# Patient Record
Sex: MALE | Race: BLACK OR AFRICAN AMERICAN | NOT HISPANIC OR LATINO
[De-identification: names, ages, dates, MRNs, and addresses within clinical notes are randomized per-mention and may not be internally consistent; named-entity substitution may affect disease eponyms.]

---

## 2021-10-13 ENCOUNTER — APPOINTMENT (OUTPATIENT)
Dept: GASTROENTEROLOGY | Facility: AMBULATORY MEDICAL SERVICES | Age: 81
End: 2021-10-13

## 2023-02-06 ENCOUNTER — INPATIENT (INPATIENT)
Facility: HOSPITAL | Age: 83
LOS: 0 days | Discharge: HOME | DRG: 287 | End: 2023-02-07
Attending: INTERNAL MEDICINE | Admitting: INTERNAL MEDICINE
Payer: COMMERCIAL

## 2023-02-06 VITALS
DIASTOLIC BLOOD PRESSURE: 83 MMHG | HEIGHT: 71 IN | RESPIRATION RATE: 18 BRPM | HEART RATE: 66 BPM | WEIGHT: 237 LBS | TEMPERATURE: 99 F | SYSTOLIC BLOOD PRESSURE: 161 MMHG | OXYGEN SATURATION: 98 %

## 2023-02-06 DIAGNOSIS — I20.0 UNSTABLE ANGINA: ICD-10-CM

## 2023-02-06 DIAGNOSIS — Z90.49 ACQUIRED ABSENCE OF OTHER SPECIFIED PARTS OF DIGESTIVE TRACT: Chronic | ICD-10-CM

## 2023-02-06 DIAGNOSIS — Z96.649 PRESENCE OF UNSPECIFIED ARTIFICIAL HIP JOINT: Chronic | ICD-10-CM

## 2023-02-06 DIAGNOSIS — I10 ESSENTIAL (PRIMARY) HYPERTENSION: ICD-10-CM

## 2023-02-06 LAB
ANION GAP SERPL CALC-SCNC: 11 MMOL/L — SIGNIFICANT CHANGE UP (ref 5–17)
APTT BLD: 23.1 SEC — LOW (ref 27.5–35.5)
BASOPHILS # BLD AUTO: 0.03 K/UL — SIGNIFICANT CHANGE UP (ref 0–0.2)
BASOPHILS NFR BLD AUTO: 0.4 % — SIGNIFICANT CHANGE UP (ref 0–2)
BUN SERPL-MCNC: 10 MG/DL — SIGNIFICANT CHANGE UP (ref 7–23)
CALCIUM SERPL-MCNC: 7.8 MG/DL — LOW (ref 8.4–10.5)
CHLORIDE SERPL-SCNC: 106 MMOL/L — SIGNIFICANT CHANGE UP (ref 96–108)
CO2 SERPL-SCNC: 24 MMOL/L — SIGNIFICANT CHANGE UP (ref 22–31)
CREAT SERPL-MCNC: 1 MG/DL — SIGNIFICANT CHANGE UP (ref 0.5–1.3)
EGFR: 76 ML/MIN/1.73M2 — SIGNIFICANT CHANGE UP
EOSINOPHIL # BLD AUTO: 0.07 K/UL — SIGNIFICANT CHANGE UP (ref 0–0.5)
EOSINOPHIL NFR BLD AUTO: 1 % — SIGNIFICANT CHANGE UP (ref 0–6)
GLUCOSE SERPL-MCNC: 117 MG/DL — HIGH (ref 70–99)
HCT VFR BLD CALC: 37.5 % — LOW (ref 39–50)
HGB BLD-MCNC: 12.1 G/DL — LOW (ref 13–17)
IMM GRANULOCYTES NFR BLD AUTO: 0.4 % — SIGNIFICANT CHANGE UP (ref 0–0.9)
INR BLD: 1.21 — HIGH (ref 0.88–1.16)
LYMPHOCYTES # BLD AUTO: 2.29 K/UL — SIGNIFICANT CHANGE UP (ref 1–3.3)
LYMPHOCYTES # BLD AUTO: 32 % — SIGNIFICANT CHANGE UP (ref 13–44)
MAGNESIUM SERPL-MCNC: 1.6 MG/DL — SIGNIFICANT CHANGE UP (ref 1.6–2.6)
MCHC RBC-ENTMCNC: 29.5 PG — SIGNIFICANT CHANGE UP (ref 27–34)
MCHC RBC-ENTMCNC: 32.3 GM/DL — SIGNIFICANT CHANGE UP (ref 32–36)
MCV RBC AUTO: 91.5 FL — SIGNIFICANT CHANGE UP (ref 80–100)
MONOCYTES # BLD AUTO: 0.84 K/UL — SIGNIFICANT CHANGE UP (ref 0–0.9)
MONOCYTES NFR BLD AUTO: 11.7 % — SIGNIFICANT CHANGE UP (ref 2–14)
NEUTROPHILS # BLD AUTO: 3.89 K/UL — SIGNIFICANT CHANGE UP (ref 1.8–7.4)
NEUTROPHILS NFR BLD AUTO: 54.5 % — SIGNIFICANT CHANGE UP (ref 43–77)
NRBC # BLD: 0 /100 WBCS — SIGNIFICANT CHANGE UP (ref 0–0)
NT-PROBNP SERPL-SCNC: 1239 PG/ML — HIGH (ref 0–300)
PLATELET # BLD AUTO: 199 K/UL — SIGNIFICANT CHANGE UP (ref 150–400)
POTASSIUM SERPL-MCNC: 3.2 MMOL/L — LOW (ref 3.5–5.3)
POTASSIUM SERPL-SCNC: 3.2 MMOL/L — LOW (ref 3.5–5.3)
PROTHROM AB SERPL-ACNC: 14.4 SEC — HIGH (ref 10.5–13.4)
RBC # BLD: 4.1 M/UL — LOW (ref 4.2–5.8)
RBC # FLD: 12.2 % — SIGNIFICANT CHANGE UP (ref 10.3–14.5)
SARS-COV-2 RNA SPEC QL NAA+PROBE: NEGATIVE — SIGNIFICANT CHANGE UP
SODIUM SERPL-SCNC: 141 MMOL/L — SIGNIFICANT CHANGE UP (ref 135–145)
TROPONIN T SERPL-MCNC: 0.01 NG/ML — SIGNIFICANT CHANGE UP (ref 0–0.01)
WBC # BLD: 7.15 K/UL — SIGNIFICANT CHANGE UP (ref 3.8–10.5)
WBC # FLD AUTO: 7.15 K/UL — SIGNIFICANT CHANGE UP (ref 3.8–10.5)

## 2023-02-06 PROCEDURE — 93458 L HRT ARTERY/VENTRICLE ANGIO: CPT | Mod: 26

## 2023-02-06 PROCEDURE — 99152 MOD SED SAME PHYS/QHP 5/>YRS: CPT

## 2023-02-06 PROCEDURE — 99285 EMERGENCY DEPT VISIT HI MDM: CPT

## 2023-02-06 PROCEDURE — 71046 X-RAY EXAM CHEST 2 VIEWS: CPT | Mod: 26

## 2023-02-06 RX ORDER — METOPROLOL TARTRATE 50 MG
50 TABLET ORAL DAILY
Refills: 0 | Status: DISCONTINUED | OUTPATIENT
Start: 2023-02-06 | End: 2023-02-06

## 2023-02-06 RX ORDER — TAMSULOSIN HYDROCHLORIDE 0.4 MG/1
0.4 CAPSULE ORAL AT BEDTIME
Refills: 0 | Status: DISCONTINUED | OUTPATIENT
Start: 2023-02-06 | End: 2023-02-07

## 2023-02-06 RX ORDER — POTASSIUM CHLORIDE 20 MEQ
60 PACKET (EA) ORAL ONCE
Refills: 0 | Status: COMPLETED | OUTPATIENT
Start: 2023-02-06 | End: 2023-02-06

## 2023-02-06 RX ORDER — LISINOPRIL 2.5 MG/1
40 TABLET ORAL DAILY
Refills: 0 | Status: DISCONTINUED | OUTPATIENT
Start: 2023-02-07 | End: 2023-02-07

## 2023-02-06 RX ORDER — POTASSIUM CHLORIDE 20 MEQ
10 PACKET (EA) ORAL ONCE
Refills: 0 | Status: COMPLETED | OUTPATIENT
Start: 2023-02-06 | End: 2023-02-06

## 2023-02-06 RX ORDER — METOPROLOL TARTRATE 50 MG
50 TABLET ORAL DAILY
Refills: 0 | Status: DISCONTINUED | OUTPATIENT
Start: 2023-02-07 | End: 2023-02-07

## 2023-02-06 RX ORDER — LATANOPROST 0.05 MG/ML
1 SOLUTION/ DROPS OPHTHALMIC; TOPICAL AT BEDTIME
Refills: 0 | Status: DISCONTINUED | OUTPATIENT
Start: 2023-02-06 | End: 2023-02-07

## 2023-02-06 RX ORDER — CLOPIDOGREL BISULFATE 75 MG/1
300 TABLET, FILM COATED ORAL ONCE
Refills: 0 | Status: COMPLETED | OUTPATIENT
Start: 2023-02-06 | End: 2023-02-06

## 2023-02-06 RX ORDER — ASPIRIN/CALCIUM CARB/MAGNESIUM 324 MG
324 TABLET ORAL ONCE
Refills: 0 | Status: COMPLETED | OUTPATIENT
Start: 2023-02-06 | End: 2023-02-06

## 2023-02-06 RX ORDER — ATORVASTATIN CALCIUM 80 MG/1
40 TABLET, FILM COATED ORAL AT BEDTIME
Refills: 0 | Status: DISCONTINUED | OUTPATIENT
Start: 2023-02-06 | End: 2023-02-07

## 2023-02-06 RX ORDER — FINASTERIDE 5 MG/1
5 TABLET, FILM COATED ORAL DAILY
Refills: 0 | Status: DISCONTINUED | OUTPATIENT
Start: 2023-02-07 | End: 2023-02-07

## 2023-02-06 RX ORDER — TIMOLOL 0.5 %
1 DROPS OPHTHALMIC (EYE)
Refills: 0 | Status: DISCONTINUED | OUTPATIENT
Start: 2023-02-06 | End: 2023-02-07

## 2023-02-06 RX ORDER — MAGNESIUM SULFATE 500 MG/ML
1 VIAL (ML) INJECTION ONCE
Refills: 0 | Status: COMPLETED | OUTPATIENT
Start: 2023-02-06 | End: 2023-02-06

## 2023-02-06 RX ORDER — ASPIRIN/CALCIUM CARB/MAGNESIUM 324 MG
81 TABLET ORAL DAILY
Refills: 0 | Status: DISCONTINUED | OUTPATIENT
Start: 2023-02-07 | End: 2023-02-07

## 2023-02-06 RX ORDER — POTASSIUM CHLORIDE 20 MEQ
40 PACKET (EA) ORAL EVERY 4 HOURS
Refills: 0 | Status: DISCONTINUED | OUTPATIENT
Start: 2023-02-06 | End: 2023-02-06

## 2023-02-06 RX ORDER — SODIUM CHLORIDE 9 MG/ML
1000 INJECTION INTRAMUSCULAR; INTRAVENOUS; SUBCUTANEOUS
Refills: 0 | Status: COMPLETED | OUTPATIENT
Start: 2023-02-06 | End: 2023-02-06

## 2023-02-06 RX ORDER — POTASSIUM CHLORIDE 20 MEQ
40 PACKET (EA) ORAL ONCE
Refills: 0 | Status: COMPLETED | OUTPATIENT
Start: 2023-02-06 | End: 2023-02-06

## 2023-02-06 RX ADMIN — Medication 324 MILLIGRAM(S): at 16:17

## 2023-02-06 RX ADMIN — TAMSULOSIN HYDROCHLORIDE 0.4 MILLIGRAM(S): 0.4 CAPSULE ORAL at 21:24

## 2023-02-06 RX ADMIN — CLOPIDOGREL BISULFATE 300 MILLIGRAM(S): 75 TABLET, FILM COATED ORAL at 16:17

## 2023-02-06 RX ADMIN — SODIUM CHLORIDE 50 MILLILITER(S): 9 INJECTION INTRAMUSCULAR; INTRAVENOUS; SUBCUTANEOUS at 21:24

## 2023-02-06 RX ADMIN — Medication 40 MILLIEQUIVALENT(S): at 21:24

## 2023-02-06 RX ADMIN — Medication 60 MILLIEQUIVALENT(S): at 17:39

## 2023-02-06 RX ADMIN — Medication 100 MILLIEQUIVALENT(S): at 17:39

## 2023-02-06 RX ADMIN — CLOPIDOGREL BISULFATE 300 MILLIGRAM(S): 75 TABLET, FILM COATED ORAL at 17:38

## 2023-02-06 RX ADMIN — Medication 100 GRAM(S): at 19:10

## 2023-02-06 RX ADMIN — ATORVASTATIN CALCIUM 40 MILLIGRAM(S): 80 TABLET, FILM COATED ORAL at 21:24

## 2023-02-06 NOTE — ED PROVIDER NOTE - PHYSICAL EXAMINATION
CONST: nontoxic NAD speaking in full sentences  HEAD: atraumatic  EYES: conjunctivae clear, PERRL, EOMI  ENT: mmm  NECK: supple/FROM, nttp  CARD: rrr   CHEST: ctab no r/r/w, no stridor/retractions/tripoding  ABD: soft, nd, nttp, no rebound/guarding  EXT: FROM, symmetric distal pulses intact  SKIN: warm, dry, no rash, mild LE edema b/l   NEURO: awake alert answering questions following commands moving all extremities

## 2023-02-06 NOTE — ED PROVIDER NOTE - OBJECTIVE STATEMENT
82yM w/ HTN BPH, comes in w/ 2 months of chest pain and SOB on mild exertion (<1 block) that resolves with rest. Then 2 days ago had an episode of chest pain while at rest for 20 minutes. Lifetime nonsmoker. Last stress test was >10 years ago. Last echo was 12/22 that was limited due to body habitus but grossly normal ventricular motion. Saw Dr Horton in the office who sent him in w/ plan for cardiac cath today.  Currently chest pain free.

## 2023-02-06 NOTE — H&P ADULT - PROBLEM SELECTOR PLAN 2
continue quinapril 40mg daily  recently  prescribed Metoprolol ER 50mg by Dr Horton   - will need to verify if started    at that time taken off nifedipine ER 90mg  by Dr Horton secondary to constipation and LE edema continue quinapril 40mg daily and HCTZ 25mg daily  recently  prescribed Metoprolol ER 50mg by Dr Horton   - will need to verify if started    at that time taken off nifedipine ER 90mg  by Dr Horton secondary to constipation and LE edema continue quinapril 40mg daily and HCTZ 25mg daily  recently  prescribed Metoprolol ER 50mg by Dr Horton   - continue  hold HCTZ given severe hypokalemia K 3.2    at that time taken off nifedipine ER 90mg  by Dr Horton secondary to constipation and LE edema continue quinapril 40mg daily   recently  prescribed Metoprolol ER 50mg by Dr Horton   - continue  hold HCTZ given severe hypokalemia K 3.2    at that time taken off nifedipine ER 90mg  by Dr Horton secondary to constipation and LE edema

## 2023-02-06 NOTE — H&P ADULT - HISTORY OF PRESENT ILLNESS
81 y/o M  non smoker with PMH HTN, glaucoma, BPH and recent onset of ANAYA and L sided chest discomfort, abnormal Coronary Ca score, who now presents via [facility]  with unstable angina, plan for a cardiac cath today.    As per patient, noted new  fatigue and ANAYA with walking less than 1/2 block about 1-2 months ago. Recently with exertional non radiating L sided chest discomfort that is self limited.  On saturday  had episode at rest ( CCS IV) L sided  non radiating, lasted less than 20 min. Denies  any associated  symptoms.    Patient had ECHO 12/28/22 that is very limited secondary to body habitus and acoustic window, EF can not be calculated but grossly normal ventricular motion.     In ED HD stable , CP free, EKG SR  with sinus arrythmia and no ischemic changes   83 y/o M  non smoker with PMH HTN,  BPH and recent onset of ANAYA  and L sided chest discomfort, abnormal Coronary Ca score, who now presents via [facility]  with unstable angina, plan for a cardiac cath today.    As per patient, noted new  fatigue and ANAYA with walking less than 1/2 block about 1-2 months ago. Recently with exertional non radiating L sided chest discomfort that is self limited.  On saturday  had episode at rest ( CCS IV) L sided  non radiating, lasted less than 20 min. Denies  any associated  symptoms.    Patient had ECHO 12/28/22 that is very limited secondary to body habitus and acoustic window, EF can not be calculated but grossly normal ventricular motion.     In ED HD stable , CP free, EKG SR  with sinus arrythmia and no ischemic changes   81 y/o M  non smoker with PMH HTN,  BPH and recent onset of ANAYA  and L sided chest discomfort, abnormal Coronary Ca score, who now presents via [facility]  with unstable angina, plan for a cardiac cath today.    As per patient, noted new  fatigue and ANAYA with walking less than 1/2 block about 1-2 months ago. Recently with exertional non radiating L sided chest discomfort that is self limited.  On saturday  had episode at rest ( CCS IV) L sided  non radiating, lasted less than 20 min. Denies  any associated  symptoms.    Patient had ECHO 12/28/22 that is very limited secondary to body habitus and acoustic window, EF can not be calculated but grossly normal ventricular motion.     In ED BP  160/83 , RR 18, O2 sat 98% RA, CP free, EKG SR  with sinus arrythmia and no ischemic changes   81 y/o M  non smoker with PMH HTN,  BPH and recent onset of ANAYA  and L sided chest discomfort, abnormal Coronary Ca score, who now presents via [facility]  with unstable angina, plan for a cardiac cath today.    As per patient, noted new  fatigue and ANAYA with walking less than 1/2 block about 1-2 months ago. Recently with exertional non radiating L sided chest discomfort that is self limited.  On saturday  had episode at rest ( CCS IV) L sided  non radiating, lasted less than 20 min. Denies  any associated  symptoms.    Patient had ECHO 12/28/22 that is very limited secondary to body habitus and acoustic window, EF can not be calculated but grossly normal ventricular motion.     In ED BP  160/83 , RR 18, O2 sat 98% RA, CP free, EKG SR  with sinus arrythmia and no ischemic changes, troponin neg x 1

## 2023-02-06 NOTE — ED ADULT NURSE NOTE - CHIEF COMPLAINT QUOTE
pt sent to ED by PCP dr Horton for evaluation. pt " pt had episode of left sided chest pain Saturday"

## 2023-02-06 NOTE — ED ADULT NURSE NOTE - OBJECTIVE STATEMENT
Patient to the ED c/o chest pain since Saturday. Told to come to ED by cardiologist for stent placement. Reports chest pain only with ambulation. Denies cardiac hx. AAOX4, NAD.

## 2023-02-06 NOTE — H&P ADULT - NSICDXPASTMEDICALHX_GEN_ALL_CORE_FT
PAST MEDICAL HISTORY:  History of Osteoarthritis (ICD9 V13.4)     Hypertension (ICD9 401.9)     ALEJANDRA (Obstructive Sleep Apnea) (ICD9 327.23)     Trace Cataracts (ICD9 366.9)

## 2023-02-06 NOTE — ED PROVIDER NOTE - CLINICAL SUMMARY MEDICAL DECISION MAKING FREE TEXT BOX
Requires admission for unstable angina, plan for cath today  EKG no acute ischemia, cp-free now  aspirin and plavix, admit to cards

## 2023-02-06 NOTE — H&P ADULT - ASSESSMENT
83 y/o M  non smoker with PMH HTN,  BPH and recent onset of ANAYA  and L sided chest discomfort, abnormal Coronary Ca score, who now presents via [facility]  with unstable angina, plan for a cardiac cath today .

## 2023-02-06 NOTE — H&P ADULT - NSHPLABSRESULTS_GEN_ALL_CORE
pending 12.1   7.15  )-----------( 199      ( 06 Feb 2023 16:43 )             37.5       02-06    141  |  106  |  10  ----------------------------<  117<H>  3.2<L>   |  24  |  1.00    Ca    7.8<L>      06 Feb 2023 16:43 12.1   7.15  )-----------( 199      ( 06 Feb 2023 16:43 )             37.5       02-06    141  |  106  |  10  ----------------------------<  117<H>  3.2<L>   |  24  |  1.00    Ca    7.8<L>      06 Feb 2023 16:43    Mg added on     CARDIAC MARKERS ( 06 Feb 2023 16:43 )  x     / 0.01 ng/mL / x     / x     / x        PT/INR - ( 06 Feb 2023 16:43 )   PT: 14.4 sec;   INR: 1.21          PTT - ( 06 Feb 2023 16:43 )  PTT:23.1 sec 12.1   7.15  )-----------( 199      ( 06 Feb 2023 16:43 )             37.5       02-06    141  |  106  |  10  ----------------------------<  117<H>  3.2<L>   |  24  |  1.00    Ca    7.8<L>      06 Feb 2023 16:43    Mg added on  1.6    CARDIAC MARKERS ( 06 Feb 2023 16:43 )  x     / 0.01 ng/mL / x     / x     / x        PT/INR - ( 06 Feb 2023 16:43 )   PT: 14.4 sec;   INR: 1.21          PTT - ( 06 Feb 2023 16:43 )  PTT:23.1 sec

## 2023-02-06 NOTE — H&P ADULT - NSHPPHYSICALEXAM_GEN_ALL_CORE
NAD  Neck: no JVD  Chest CTA b/l   Cor: S1 S2 RRR, no murmurs  Abd: obese, NT/ND  Ext 1+ pitting edema, well perfused  Vasc: radial 2+ b/l ,  DP/PT 2+ b/l   Neuro: Aox 3, fluent speech, no gross deficts  Psych: appropriate affect, cooperative  Skin: dry, warm, no rash NAD  Neck: no JVD  Chest CTA b/l   Cor: S1 S2 RRR, no murmurs  Abd: obese, NT/ND  Ext 1+ pitting edema, well perfused  Vasc: radial 2+ b/l ,  DP/PT 2+ b/l   Neuro: A + O x 3, fluent speech, no gross deficts  Psych: appropriate affect, cooperative  Skin: dry, warm, no rash

## 2023-02-06 NOTE — ED ADULT TRIAGE NOTE - CHIEF COMPLAINT QUOTE
pt sent to ED by PCP for evaluation. pt " pt had episode of left sided chest pain Saturday" pt sent to ED by PCP dr Horton for evaluation. pt " pt had episode of left sided chest pain Saturday"

## 2023-02-06 NOTE — H&P ADULT - PROBLEM SELECTOR PLAN 1
CP free, HD stable    awaiting labs  consented, on cath schedule   will need plavix 600mg x 1  pending labs and covid PCR    recently  prescribed Metoprolol ER 50mg by Dr Horton   - will need to verify if started  continue atorva 40mg  continue asa 81  ECHO ordered as outpatient ECHO is a limited study   F/UP am lipids/HgbA1C      ASA II      Mallampati IV   Angina IV    Risks & benefits of procedure and alternative therapy have been explained to the patient including but not limited to: allergic reaction, bleeding w/possible need for blood transfusion, infection, renal and vascular compromise, limb damage, arrhythmia, stroke, vessel dissection/perforation, Myocardial infarction, emergent CABG. Informed consent obtained and in chart. CP free, HD stable    awaiting labs  consented, on cath schedule   will need plavix 600mg x 1  pending labs and covid PCR  on exam with LE pitting edema, and PVC on CXR - no IVF bolus, IVF at 50 cc/h     recently  prescribed Metoprolol ER 50mg by Dr Horton   - will need to verify if started  continue atorva 40mg  continue asa 81  ECHO ordered as outpatient ECHO is a limited study   F/UP am lipids/HgbA1C      ASA II      Mallampati IV   Angina IV    Risks & benefits of procedure and alternative therapy have been explained to the patient including but not limited to: allergic reaction, bleeding w/possible need for blood transfusion, infection, renal and vascular compromise, limb damage, arrhythmia, stroke, vessel dissection/perforation, Myocardial infarction, emergent CABG. Informed consent obtained and in chart. CP free, HD stable    Labs with K 3.2 - ordered KCL 10Meq IV stat and oral KCL 60Meq followed by 40Meq in 4 hrs   F/UP another BMP later tonight at 11 PM  consented, on cath schedule   will need  total plavix 600mg x 1    on exam with LE pitting edema, and PVC on CXR - no IVF bolus, IVF at 50 cc/h     recently  prescribed Metoprolol ER 50mg by Dr Horton   - will need to verify if started  continue atorva 40mg  continue asa 81  ECHO ordered as outpatient ECHO is a limited study   F/UP am lipids/HgbA1C      ASA II      Mallampati IV   Angina IV    Risks & benefits of procedure and alternative therapy have been explained to the patient including but not limited to: allergic reaction, bleeding w/possible need for blood transfusion, infection, renal and vascular compromise, limb damage, arrhythmia, stroke, vessel dissection/perforation, Myocardial infarction, emergent CABG. Informed consent obtained and in chart. CP free, HD stable    Labs with K 3.2 - ordered KCL 10Meq IV stat and oral KCL 60Meq followed by 40Meq in 4 hrs   F/UP another BMP later tonight at 11 PM  Mg 1.6 - repleted with 1gm IV     consented, on cath schedule   will need  total plavix 600mg x 1    on exam with LE pitting edema, and PVC on CXR - no IVF bolus, IVF at 50 cc/h     recently  prescribed Metoprolol ER 50mg by Dr Horton   -- continue   continue atorva 40mg  continue asa 81  ECHO ordered as outpatient ECHO is a limited study   F/UP am lipids/HgbA1C      ASA II      Mallampati IV   Angina IV    Risks & benefits of procedure and alternative therapy have been explained to the patient including but not limited to: allergic reaction, bleeding w/possible need for blood transfusion, infection, renal and vascular compromise, limb damage, arrhythmia, stroke, vessel dissection/perforation, Myocardial infarction, emergent CABG. Informed consent obtained and in chart.

## 2023-02-07 ENCOUNTER — TRANSCRIPTION ENCOUNTER (OUTPATIENT)
Age: 83
End: 2023-02-07

## 2023-02-07 VITALS — TEMPERATURE: 99 F

## 2023-02-07 DIAGNOSIS — I25.10 ATHEROSCLEROTIC HEART DISEASE OF NATIVE CORONARY ARTERY WITHOUT ANGINA PECTORIS: ICD-10-CM

## 2023-02-07 LAB
A1C WITH ESTIMATED AVERAGE GLUCOSE RESULT: 5.6 % — SIGNIFICANT CHANGE UP (ref 4–5.6)
ANION GAP SERPL CALC-SCNC: 10 MMOL/L — SIGNIFICANT CHANGE UP (ref 5–17)
ANION GAP SERPL CALC-SCNC: 9 MMOL/L — SIGNIFICANT CHANGE UP (ref 5–17)
BUN SERPL-MCNC: 11 MG/DL — SIGNIFICANT CHANGE UP (ref 7–23)
BUN SERPL-MCNC: 13 MG/DL — SIGNIFICANT CHANGE UP (ref 7–23)
CALCIUM SERPL-MCNC: 8.2 MG/DL — LOW (ref 8.4–10.5)
CALCIUM SERPL-MCNC: 8.9 MG/DL — SIGNIFICANT CHANGE UP (ref 8.4–10.5)
CHLORIDE SERPL-SCNC: 101 MMOL/L — SIGNIFICANT CHANGE UP (ref 96–108)
CHLORIDE SERPL-SCNC: 103 MMOL/L — SIGNIFICANT CHANGE UP (ref 96–108)
CHOLEST SERPL-MCNC: 96 MG/DL — SIGNIFICANT CHANGE UP
CO2 SERPL-SCNC: 26 MMOL/L — SIGNIFICANT CHANGE UP (ref 22–31)
CO2 SERPL-SCNC: 28 MMOL/L — SIGNIFICANT CHANGE UP (ref 22–31)
CREAT SERPL-MCNC: 1.14 MG/DL — SIGNIFICANT CHANGE UP (ref 0.5–1.3)
CREAT SERPL-MCNC: 1.19 MG/DL — SIGNIFICANT CHANGE UP (ref 0.5–1.3)
EGFR: 62 ML/MIN/1.73M2 — SIGNIFICANT CHANGE UP
EGFR: 65 ML/MIN/1.73M2 — SIGNIFICANT CHANGE UP
ESTIMATED AVERAGE GLUCOSE: 114 MG/DL — SIGNIFICANT CHANGE UP (ref 68–114)
GLUCOSE SERPL-MCNC: 117 MG/DL — HIGH (ref 70–99)
GLUCOSE SERPL-MCNC: 144 MG/DL — HIGH (ref 70–99)
HCT VFR BLD CALC: 39.5 % — SIGNIFICANT CHANGE UP (ref 39–50)
HDLC SERPL-MCNC: 41 MG/DL — SIGNIFICANT CHANGE UP
HGB BLD-MCNC: 12.6 G/DL — LOW (ref 13–17)
LIPID PNL WITH DIRECT LDL SERPL: 41 MG/DL — SIGNIFICANT CHANGE UP
MAGNESIUM SERPL-MCNC: 1.7 MG/DL — SIGNIFICANT CHANGE UP (ref 1.6–2.6)
MAGNESIUM SERPL-MCNC: 1.8 MG/DL — SIGNIFICANT CHANGE UP (ref 1.6–2.6)
MCHC RBC-ENTMCNC: 29 PG — SIGNIFICANT CHANGE UP (ref 27–34)
MCHC RBC-ENTMCNC: 31.9 GM/DL — LOW (ref 32–36)
MCV RBC AUTO: 91 FL — SIGNIFICANT CHANGE UP (ref 80–100)
NON HDL CHOLESTEROL: 55 MG/DL — SIGNIFICANT CHANGE UP
NRBC # BLD: 0 /100 WBCS — SIGNIFICANT CHANGE UP (ref 0–0)
PLATELET # BLD AUTO: 188 K/UL — SIGNIFICANT CHANGE UP (ref 150–400)
POTASSIUM SERPL-MCNC: 3.8 MMOL/L — SIGNIFICANT CHANGE UP (ref 3.5–5.3)
POTASSIUM SERPL-MCNC: 4.3 MMOL/L — SIGNIFICANT CHANGE UP (ref 3.5–5.3)
POTASSIUM SERPL-SCNC: 3.8 MMOL/L — SIGNIFICANT CHANGE UP (ref 3.5–5.3)
POTASSIUM SERPL-SCNC: 4.3 MMOL/L — SIGNIFICANT CHANGE UP (ref 3.5–5.3)
RBC # BLD: 4.34 M/UL — SIGNIFICANT CHANGE UP (ref 4.2–5.8)
RBC # FLD: 12.3 % — SIGNIFICANT CHANGE UP (ref 10.3–14.5)
SODIUM SERPL-SCNC: 138 MMOL/L — SIGNIFICANT CHANGE UP (ref 135–145)
SODIUM SERPL-SCNC: 139 MMOL/L — SIGNIFICANT CHANGE UP (ref 135–145)
TRIGL SERPL-MCNC: 72 MG/DL — SIGNIFICANT CHANGE UP
WBC # BLD: 6.65 K/UL — SIGNIFICANT CHANGE UP (ref 3.8–10.5)
WBC # FLD AUTO: 6.65 K/UL — SIGNIFICANT CHANGE UP (ref 3.8–10.5)

## 2023-02-07 PROCEDURE — C8929: CPT

## 2023-02-07 PROCEDURE — 83036 HEMOGLOBIN GLYCOSYLATED A1C: CPT

## 2023-02-07 PROCEDURE — 80061 LIPID PANEL: CPT

## 2023-02-07 PROCEDURE — 83880 ASSAY OF NATRIURETIC PEPTIDE: CPT

## 2023-02-07 PROCEDURE — C1887: CPT

## 2023-02-07 PROCEDURE — C1769: CPT

## 2023-02-07 PROCEDURE — 93306 TTE W/DOPPLER COMPLETE: CPT | Mod: 26

## 2023-02-07 PROCEDURE — 85730 THROMBOPLASTIN TIME PARTIAL: CPT

## 2023-02-07 PROCEDURE — 85025 COMPLETE CBC W/AUTO DIFF WBC: CPT

## 2023-02-07 PROCEDURE — 99285 EMERGENCY DEPT VISIT HI MDM: CPT

## 2023-02-07 PROCEDURE — C1894: CPT

## 2023-02-07 PROCEDURE — 83735 ASSAY OF MAGNESIUM: CPT

## 2023-02-07 PROCEDURE — 85027 COMPLETE CBC AUTOMATED: CPT

## 2023-02-07 PROCEDURE — 99239 HOSP IP/OBS DSCHRG MGMT >30: CPT

## 2023-02-07 PROCEDURE — 84484 ASSAY OF TROPONIN QUANT: CPT

## 2023-02-07 PROCEDURE — 36415 COLL VENOUS BLD VENIPUNCTURE: CPT

## 2023-02-07 PROCEDURE — 87635 SARS-COV-2 COVID-19 AMP PRB: CPT

## 2023-02-07 PROCEDURE — C1760: CPT

## 2023-02-07 PROCEDURE — 85610 PROTHROMBIN TIME: CPT

## 2023-02-07 PROCEDURE — 71046 X-RAY EXAM CHEST 2 VIEWS: CPT

## 2023-02-07 PROCEDURE — 80048 BASIC METABOLIC PNL TOTAL CA: CPT

## 2023-02-07 RX ORDER — POTASSIUM CHLORIDE 20 MEQ
40 PACKET (EA) ORAL ONCE
Refills: 0 | Status: COMPLETED | OUTPATIENT
Start: 2023-02-07 | End: 2023-02-07

## 2023-02-07 RX ORDER — ASPIRIN/CALCIUM CARB/MAGNESIUM 324 MG
1 TABLET ORAL
Qty: 0 | Refills: 0 | DISCHARGE

## 2023-02-07 RX ORDER — METOPROLOL TARTRATE 50 MG
1 TABLET ORAL
Qty: 30 | Refills: 3
Start: 2023-02-07 | End: 2023-06-06

## 2023-02-07 RX ORDER — HYDROCHLOROTHIAZIDE 25 MG
1 TABLET ORAL
Qty: 0 | Refills: 0 | DISCHARGE

## 2023-02-07 RX ORDER — QUINAPRIL HYDROCHLORIDE 40 MG/1
1 TABLET, FILM COATED ORAL
Qty: 0 | Refills: 0 | DISCHARGE

## 2023-02-07 RX ORDER — LATANOPROST 0.05 MG/ML
1 SOLUTION/ DROPS OPHTHALMIC; TOPICAL
Qty: 0 | Refills: 0 | DISCHARGE

## 2023-02-07 RX ORDER — TAMSULOSIN HYDROCHLORIDE 0.4 MG/1
1 CAPSULE ORAL
Qty: 0 | Refills: 0 | DISCHARGE

## 2023-02-07 RX ORDER — MAGNESIUM OXIDE 400 MG ORAL TABLET 241.3 MG
800 TABLET ORAL ONCE
Refills: 0 | Status: COMPLETED | OUTPATIENT
Start: 2023-02-07 | End: 2023-02-07

## 2023-02-07 RX ORDER — METOPROLOL TARTRATE 50 MG
1 TABLET ORAL
Qty: 0 | Refills: 0 | DISCHARGE

## 2023-02-07 RX ORDER — HYDROCHLOROTHIAZIDE 25 MG
1 TABLET ORAL
Qty: 30 | Refills: 3
Start: 2023-02-07 | End: 2023-06-06

## 2023-02-07 RX ORDER — ASPIRIN/CALCIUM CARB/MAGNESIUM 324 MG
1 TABLET ORAL
Qty: 30 | Refills: 3
Start: 2023-02-07 | End: 2023-06-06

## 2023-02-07 RX ORDER — FINASTERIDE 5 MG/1
1 TABLET, FILM COATED ORAL
Qty: 0 | Refills: 0 | DISCHARGE

## 2023-02-07 RX ORDER — QUINAPRIL HYDROCHLORIDE 40 MG/1
1 TABLET, FILM COATED ORAL
Qty: 30 | Refills: 3
Start: 2023-02-07 | End: 2023-06-06

## 2023-02-07 RX ORDER — ATORVASTATIN CALCIUM 80 MG/1
1 TABLET, FILM COATED ORAL
Qty: 0 | Refills: 0 | DISCHARGE

## 2023-02-07 RX ORDER — TIMOLOL 0.5 %
1 DROPS OPHTHALMIC (EYE)
Qty: 0 | Refills: 0 | DISCHARGE

## 2023-02-07 RX ORDER — ATORVASTATIN CALCIUM 80 MG/1
1 TABLET, FILM COATED ORAL
Qty: 30 | Refills: 3
Start: 2023-02-07 | End: 2023-06-06

## 2023-02-07 RX ADMIN — LISINOPRIL 40 MILLIGRAM(S): 2.5 TABLET ORAL at 05:35

## 2023-02-07 RX ADMIN — Medication 40 MILLIEQUIVALENT(S): at 03:44

## 2023-02-07 RX ADMIN — Medication 81 MILLIGRAM(S): at 11:00

## 2023-02-07 RX ADMIN — FINASTERIDE 5 MILLIGRAM(S): 5 TABLET, FILM COATED ORAL at 11:00

## 2023-02-07 RX ADMIN — Medication 50 MILLIGRAM(S): at 05:35

## 2023-02-07 RX ADMIN — MAGNESIUM OXIDE 400 MG ORAL TABLET 800 MILLIGRAM(S): 241.3 TABLET ORAL at 03:44

## 2023-02-07 RX ADMIN — Medication 1 DROP(S): at 05:34

## 2023-02-07 NOTE — DISCHARGE NOTE PROVIDER - HOSPITAL COURSE
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 Yes          *Pt instructed to bring Cardiac Rehab prescription with them to Cardiology Follow up appointment for assistance with enrollment: Yes    Statin Prescribed (STEMI/NSTEMI/ACS/UA &/OR Post PCI this admission):  Atorvastatin 40mg   DAPT Post PCI: Prescriptions for Aspirin/Plavix/Brilinta/Effient e-prescribed to patient's pharmacy: NO.                *No Aspirin/Plavix/Brilinta/Effient prescribed due to nonobstructive CAD. 81 y/o M  non smoker with PMH HTN,  BPH and recent onset of ANAYA  and L sided chest discomfort, abnormal Coronary Ca score, who now presents via 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 Yes          *Pt instructed to bring Cardiac Rehab prescription with them to Cardiology Follow up appointment for assistance with enrollment: Yes    Statin Prescribed (STEMI/NSTEMI/ACS/UA &/OR Post PCI this admission):  Atorvastatin 40mg   DAPT Post PCI: Prescriptions for Aspirin/Plavix/Brilinta/Effient e-prescribed to patient's pharmacy: NO.                *No Aspirin/Plavix/Brilinta/Effient prescribed due to nonobstructive CAD. 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 Horton     #HTN  Home med: quinapril 40 mg qd and HCTZ 25 mg QD  C/w quinapril 40 mg qd and HCTZ 25 mg QD on discharge     Dx: TREATMENT FOR STEMI or HEART FAILURE THIS ADMISSION: NO                   Cardiac Rehab Indications (STEMI/NSTEMI/ACS/Unstable Angina/CHF/Chronic Stable Angina/Heart Surgery (CABG,Valve)/Post PCI):  YES         *Education on benefits of Cardiac Rehab provided to patient: Yes         *Referral and Prescription Given for Cardiac Rehab: Yes         *Pt given list of locations & instructed to contact their insurance company to review list of participating providers. Yes          *Pt instructed to bring Cardiac Rehab prescription with them to Cardiology Follow up appointment for assistance with enrollment: Yes    Statin Prescribed (STEMI/NSTEMI/ACS/UA &/OR Post PCI this admission):  Atorvastatin 40mg   DAPT Post PCI: Prescriptions for Aspirin/Plavix/Brilinta/Effient e-prescribed to patient's pharmacy: NO.                *No Aspirin/Plavix/Brilinta/Effient prescribed due to nonobstructive CAD.

## 2023-02-07 NOTE — DISCHARGE NOTE NURSING/CASE MANAGEMENT/SOCIAL WORK - NSDCPEFALRISK_GEN_ALL_CORE
For information on Fall & Injury Prevention, visit: [email or web address] OR  [email or web address] OR  https://www.cdc.gov/steadi/patient.html

## 2023-02-07 NOTE — DISCHARGE NOTE PROVIDER - NSDCCPCAREPLAN_GEN_ALL_CORE_FT
PRINCIPAL DISCHARGE DIAGNOSIS  Diagnosis: Unstable angina  Assessment and Plan of Treatment: Angina is discomfort in the chest, neck, arm, jaw, or back. The discomfort is caused by a lack of blood in the middle layer of the heart wall (myocardium) most of the time this is due to atherosclerosis. Atherosclerosis is when plaque builds up in the arteries. This causes narrowing and hardening of the arteries. Arteries are blood vessels that carry blood from the heart to all parts of the body. This blood contains oxygen. Plaque occurs due to inflammation or from a buildup of fat, cholesterol, calcium, waste products of cells, and a clotting material in the blood (fibrin). Plaque decreases the amount of blood that can flow through the artery. You were found to have some plaque that does not completely block the arteries in your heart but may give you unstable angina symptoms from time to time. Please continue to take your ASPIRIN and ATORVASTATIN to help prevent worsening of your coronary artery disease.        SECONDARY DISCHARGE DIAGNOSES  Diagnosis: HTN (hypertension)  Assessment and Plan of Treatment: You have a known history of high blood pressure prior to your admission. To manage this you are on a medication called QUINAPRIL High blood pressure can cause damage to your heart and kidneys and increases your risk of heart attack and stroke. To avoid this, It is important that you continue to take this medication when you are discharged so that you can continue to control your blood pressure. Additionally be sure to follow up with your primary care physician on a regular basis to make sure your blood pressure continues to be well controlled. If you experience symptoms such as but not limited to: sudden onset blurry vision, nausea, vomiting, chest pain, shortness of breath, or palpitations, please go to the nearest emergency room.

## 2023-02-07 NOTE — DISCHARGE NOTE NURSING/CASE MANAGEMENT/SOCIAL WORK - PATIENT PORTAL LINK FT
You can access the FollowMyHealth Patient Portal offered by [facility] by registering at the following website: [email or web address]. By joining [employer]’s FollowMyHealth portal, you will also be able to view your health information using other applications (apps) compatible with our system.

## 2023-02-07 NOTE — DISCHARGE NOTE PROVIDER - PROVIDER TOKENS
PROVIDER:[TOKEN:[[id number]],FOLLOWUP:[2 weeks],ESTABLISHEDPATIENT:[T]] FREE:[LAST:[Horton],FIRST:[Eden],PHONE:[phone]],FAX:[(   )    -],ADDRESS:[[address] [place] [address] [address]  [place], [place] [address]    OR ANY OF HIS OTHER LOCATIONS],FOLLOWUP:[2 weeks],ESTABLISHEDPATIENT:[T]] FREE:[LAST:[Horton],FIRST:[Eden],PHONE:[phone]],FAX:[(   )    -],ADDRESS:[[address] [place] [address]],FOLLOWUP:[2 weeks]]

## 2023-02-07 NOTE — DISCHARGE NOTE PROVIDER - NSDCMRMEDTOKEN_GEN_ALL_CORE_FT
aspirin 81 mg oral tablet: 1 tab(s) orally once a day  atorvastatin 40 mg oral tablet: 1 tab(s) orally once a day  finasteride 5 mg oral tablet: 1 tab(s) orally once a day  hydroCHLOROthiazide 25 mg oral tablet: 1 tab(s) orally once a day  latanoprost 0.005% ophthalmic solution: 1 drop(s) to each affected eye once a day (in the evening)  Metoprolol Succinate ER 50 mg oral tablet, extended release: 1 tab(s) orally once a day  quinapril 40 mg oral tablet: 1 tab(s) orally once a day  tamsulosin 0.4 mg oral capsule: 1 cap(s) orally once a day  timolol hemihydrate 0.5% ophthalmic solution: 1 drop(s) to each affected eye 2 times a day

## 2023-02-07 NOTE — DISCHARGE NOTE PROVIDER - CARE PROVIDER_API CALL
KADEN GODDARD  Cardiovascular Diseases  [address] [place] [id number]  Phone: [phone]  Fax: [phone]  Established Patient  Follow Up Time: 2 weeks   Eden Horton  [address] [facility] [address] [address]    OR ANY OF HIS OTHER LOCATIONS  Phone: [phone]  Fax: (   )    -  Established Patient  Follow Up Time: 2 weeks   Eden Horton  [address] [place] [address]  Phone: [phone]  Fax: (   )    -  Follow Up Time: 2 weeks

## 2023-02-09 DIAGNOSIS — N40.0 BENIGN PROSTATIC HYPERPLASIA WITHOUT LOWER URINARY TRACT SYMPTOMS: ICD-10-CM

## 2023-02-09 DIAGNOSIS — E87.6 HYPOKALEMIA: ICD-10-CM

## 2023-02-09 DIAGNOSIS — E78.5 HYPERLIPIDEMIA, UNSPECIFIED: ICD-10-CM

## 2023-02-09 DIAGNOSIS — I25.110 ATHEROSCLEROTIC HEART DISEASE OF NATIVE CORONARY ARTERY WITH UNSTABLE ANGINA PECTORIS: ICD-10-CM

## 2023-02-09 DIAGNOSIS — I20.0 UNSTABLE ANGINA: ICD-10-CM

## 2023-02-09 DIAGNOSIS — Z79.82 LONG TERM (CURRENT) USE OF ASPIRIN: ICD-10-CM

## 2023-02-09 DIAGNOSIS — Z96.649 PRESENCE OF UNSPECIFIED ARTIFICIAL HIP JOINT: ICD-10-CM

## 2023-02-09 DIAGNOSIS — I10 ESSENTIAL (PRIMARY) HYPERTENSION: ICD-10-CM

## 2023-02-09 DIAGNOSIS — E83.42 HYPOMAGNESEMIA: ICD-10-CM

## 2025-06-15 NOTE — ED ADULT NURSE NOTE - NS ED NURSE LEVEL OF CONSCIOUSNESS MENTAL STATUS
Awake